# Patient Record
Sex: FEMALE | Race: WHITE
[De-identification: names, ages, dates, MRNs, and addresses within clinical notes are randomized per-mention and may not be internally consistent; named-entity substitution may affect disease eponyms.]

---

## 2017-11-28 ENCOUNTER — HOSPITAL ENCOUNTER (OUTPATIENT)
Dept: HOSPITAL 61 - PCVCIMAG | Age: 57
Discharge: HOME | End: 2017-11-28
Attending: INTERNAL MEDICINE
Payer: COMMERCIAL

## 2017-11-28 DIAGNOSIS — Z79.899: ICD-10-CM

## 2017-11-28 DIAGNOSIS — E11.9: ICD-10-CM

## 2017-11-28 DIAGNOSIS — R06.09: ICD-10-CM

## 2017-11-28 DIAGNOSIS — Z79.84: ICD-10-CM

## 2017-11-28 DIAGNOSIS — I10: Primary | ICD-10-CM

## 2017-11-28 DIAGNOSIS — Z87.891: ICD-10-CM

## 2017-11-28 PROCEDURE — 93306 TTE W/DOPPLER COMPLETE: CPT

## 2017-11-28 PROCEDURE — 93005 ELECTROCARDIOGRAM TRACING: CPT

## 2017-11-28 NOTE — PCVCIMAG
--------------- APPROVED REPORT --------------





Study performed:  11/28/2017 15:36:50



EXAM: Comprehensive 2D, Doppler, and color-flow 

Echocardiogram

Patient Location: Echo lab

Room #:  3Status:  routine



BSA:         2.13

HR: 78 bpmBP:          130/72 mmHg

Rhythm: NSR



Other Information 

Study Quality: Fair



Risk Factors: 

Cardiac Risk Factors:  DM, HTN



Indications

Diabetes

Dyspnea 

Hypertension/HDD



2D Dimensions

LVEF(%):  49.97 (&gt;50%)

IVSd:  6.56 (7-11mm)LVOT Diam:  19.10 (18-24mm) 

LVDd:  49.30 mm

PWd:  8.68 (7-11mm)

LVDs:  36.77 (25-40mm)

Left Atrium:  32.15 (27-40mm)

Aortic Root:  22.70 mm

LV Single Plane 4CH:  70.75 %

Felder's LVEF:  49.97 %



Volumes

Left Atrial Volume (Systole)

Single Plane 4CH:  53.85 mLSingle Plane 2CH:  21.55 mL

Biplane LA Volume:  41.00 mLLA ESV Index:  19.00 mL/m2



Aortic Valve

AoV Peak Puma.:  1.57 m/s

AO Peak Gr.:  9.83 mmHgLVOT Max PG:  3.18 mmHg

LVOT Max V:  0.89 m/s

ROCKY Vmax: 1.63 cm2



Mitral Valve

E/A Ratio:  1.1

MV Decel. Time:  155.04 ms

MV E Max Puma.:  0.96 m/s

MV A Puma.:  0.90 m/s

MV PHT:  44.96 ms

IVRT:  69.20 ms



TDI

E/Lateral E':  12.00E/Medial E':  16.00

Medial E' Puma.:  0.06 m/s

Lateral E' Puma.:  0.08 m/s



Pulmonary Valve

PV Peak Puma.:  1.04 m/sPV Peak Gr.:  4.31 mmHg



Pulmonary Vein

P Vein S:    0.59 m/sP Vein A:  0.36 m/s

P Vein D:   0.55 m/sP Vein A Dur.:  86.5 msec

P Vein S/D Ratio:  1.07



Tricuspid Valve

TV Vmax:  0.60 m/s



Left Ventricle

The left ventricle is normal size. There is normal LV segmental wall 

motion. There is normal left ventricular wall thickness. Left 

ventricular systolic function is normal. The left ventricular 

ejection fraction is within the normal range. LVEF is 65-70%.



Right Ventricle

The right ventricle is normal size. The right ventricular systolic 

function is normal.



Atria

The left atrium size is normal. The right atrium size is 

normal.



Aortic Valve

The aortic valve is normal in structure. No aortic regurgitation is 

present. There is no aortic valvular stenosis.



Mitral Valve

The mitral valve is normal in structure. There is no mitral valve 

regurgitation noted. No evidence of mitral valve stenosis.



Tricuspid Valve

The tricuspid valve is normal in structure. No tricuspid 

regurgitation. Doppler findings do not suggest pulmonary 

hypertension.



Pulmonic Valve

The pulmonary valve is normal in structure. There is no pulmonic 

valvular regurgitation.



Great Vessels

The aortic root is normal in size. The ascending aorta is normal in 

size. The inferior vena cava is not well 

visualized.



Pericardium

There is no pericardial effusion. There is no pleural 

effusion.



&lt;Conclusion&gt;

The left ventricle is normal size.

There is normal left ventricular wall thickness.

Left ventricular systolic function is normal.

The right ventricle is normal size.

The left atrium size is normal.

The aortic valve is normal in structure.

The mitral valve is normal in structure.

No tricuspid regurgitation.

There is no pericardial effusion.

## 2019-09-25 ENCOUNTER — HOSPITAL ENCOUNTER (OUTPATIENT)
Dept: HOSPITAL 61 - PCVCIMAG | Age: 59
Discharge: HOME | End: 2019-09-25
Attending: INTERNAL MEDICINE
Payer: COMMERCIAL

## 2019-09-25 DIAGNOSIS — Z01.818: Primary | ICD-10-CM

## 2019-09-25 PROCEDURE — 93306 TTE W/DOPPLER COMPLETE: CPT

## 2019-09-25 NOTE — PCVCIMAG
--------------- APPROVED REPORT --------------





Study performed:  2019 13:37:23



EXAM: Comprehensive 2D, Doppler, and color-flow 

Echocardiogram

Patient Location: Echo lab

Status:  routine



BSA:         2.22

HR: 67 bpmBP:          144/70 mmHg

Rhythm: NSR



Other Information 

Study Quality: Adequate



Risk Factors: 

Cardiac Risk Factors:  HTN



Indications

Pre-Op



2D Dimensions

IVSd:  10.47 (7-11mm)

LVDd:  43.00 mm

PWd:  10.40 (7-11mm)

LVDs:  35.25 (25-40mm)

Left Atrium:  32.43 (27-40mm)

Aortic Root:  28.79 mm

LV Single Plane 4CH:  48.27 %

LV Single Plane 2CH:  46.55 %

Biplane EF:  52.0 %



Volumes

Left Atrial Volume (Systole)

Single Plane 4CH:  49.79 mLSingle Plane 2CH:  68.24 mL

LA ESV Index:  27.00 mL/m2



Aortic Valve

AoV Peak Puma.:  1.52 m/s

AO Peak Gr.:  9.21 mmHgLVOT Max P.09 mmHg

LVOT Max V:  1.23 m/s



Mitral Valve

E/A Ratio:  1.7

MV Decel. Time:  280.09 ms

MV E Max Puma.:  0.93 m/s

MV A Puma.:  0.54 m/s

IVRT:  89.97 ms



Pulmonary Valve

PV Peak Puma.:  0.96 m/sPV Peak Gr.:  3.71 mmHg



Pulmonary Vein

P Vein S:    0.30 m/sP Vein A:  0.35 m/s

P Vein D:   0.47 m/sP Vein A Dur.:  134.9 msec

P Vein S/D Ratio:  0.64



Tricuspid Valve

TR Peak Puma.:  2.48 m/s

TR Peak Gr.:  24.63 mmHg



Left Ventricle

The left ventricle is normal size. There is normal LV segmental wall 

motion. There is normal left ventricular wall thickness. Left 

ventricular systolic function is normal. The left ventricular 

ejection fraction is within the normal range. LVEF is 55%. Grade II - 

pseudonormal filling dynamics.



Right Ventricle

The right ventricle is normal size. The right ventricular systolic 

function is normal.



Atria

The left atrium size is normal. The right atrium size is 

normal.



Aortic Valve

The aortic valve is normal in structure. No aortic regurgitation is 

present. There is no aortic valvular stenosis.



Mitral Valve

The mitral valve is normal in structure. There is no mitral valve 

regurgitation noted. No evidence of mitral valve stenosis.



Tricuspid Valve

The tricuspid valve is normal in structure. Trace tricuspid 

regurgitation with PAP of 32 mmHg.



Pulmonic Valve

The pulmonary valve is normal in structure. There is no pulmonic 

valvular regurgitation.



Great Vessels

The aortic root is normal in size. IVC is normal in size and 

collapses >50% with inspiration.



Pericardium

There is no pericardial effusion. There is no pleural 

effusion.



<Conclusion>

The left ventricle is normal size.

There is normal left ventricular wall thickness.

Left ventricular systolic function is normal.

Grade II - pseudonormal filling dynamics.

The right ventricle is normal size.

The left atrium size is normal.

The aortic valve is normal in structure.

There is no mitral valve regurgitation noted.

Trace tricuspid regurgitation with PAP of 32 mmHg.

## 2019-11-12 ENCOUNTER — HOSPITAL ENCOUNTER (OUTPATIENT)
Dept: HOSPITAL 61 - PCVCIMAG | Age: 59
Discharge: HOME | End: 2019-11-12
Attending: INTERNAL MEDICINE
Payer: COMMERCIAL

## 2019-11-12 DIAGNOSIS — Z87.891: ICD-10-CM

## 2019-11-12 DIAGNOSIS — R60.9: ICD-10-CM

## 2019-11-12 DIAGNOSIS — Z79.84: ICD-10-CM

## 2019-11-12 DIAGNOSIS — I10: ICD-10-CM

## 2019-11-12 DIAGNOSIS — Z01.818: Primary | ICD-10-CM

## 2019-11-12 PROCEDURE — 78452 HT MUSCLE IMAGE SPECT MULT: CPT

## 2019-11-12 PROCEDURE — 93017 CV STRESS TEST TRACING ONLY: CPT

## 2019-11-12 PROCEDURE — A9500 TC99M SESTAMIBI: HCPCS

## 2019-11-12 NOTE — PCVCIMAG
--------------- APPROVED REPORT --------------





Imaging Protocol: Rest Tc-99m/Stress Tc-99m 1 day

Study performed:  11/12/2019 09:40:47



Indication: Pre Op

Patient Location: Out-Patient

Stress Nurse: Vicky Aguero RN, Radha West RN

NM Tech:Leon Ramsay NMZAKB



Ht: 5 ft 7 in Wt: 250 lbs BSA:  2.22 m2

HR: 66 bpm                      BP: 154/78 mmHg         BMI:  

39.15

Rhythm:  Sinus Arrythmia



Medical History

Medical History: Age, HTN, DM Insulin, Former Smoker

Medications: Labetalol, Nifedipine

Allergies: Cipro, Macrobid, Sulfa

Pretest Chest Pain Characteristics: No chest pain

Exercise History: Indeterminate



Resting Data

Rest SPECT myocardial perfusion imaging was performed in supine 

position 45 minutes following the intravenous injection of 15.6 mCi 

of Tc-99m Sestamibi.

Time of rest injection: 0920     Date: 11/12/2019

Administration Route: IV

Administration Site: Left Arm



Pharmacologic Stress

Pharmacologic stress test was performed by injecting Regadenoson 0.4 

mg IV push over 10-15 seconds immediately followed by the intravenous 

injection of 46.5 mCi of Tc-99m Sestamibi.

Time of stress injection: 1035     Date: 11/12/2019

Administration Route: IV

Administration Site: Left Arm

Gated Stress SPECT was performed 45 minutes after stress 

injection.

The images were gated to evaluate regional wall motion and calculate 

left ventricular ejection fraction. 



Stress Test Details

Stress Test:  Pharmacologic stress was paired with low level 

exercise.

  Reason for pharmacologic stress test: Ankle Problems.



HRMax Heart Rate (APMHR): 164 bpm 

Resting HR:            66 bpmTarget HR (85% APMHR): 139 bpm

Max HR Achieved:  103 bpm

% of APMHR:         62

Recovery HR:            61 bpm



BP

Resting BP:  154/78 mmHg

Max BP:       174/78 mmHg

Recovery BP:       157/71 mmHg

ECG

Resting ECG:  Sinus Rhythm

Stress ECG:     Sinus Tachycardia

ST Change: Non-ischemic

Arrhythmia:    PVC's

Recovery ECG: Sinus Rhythm



Clinical

Reason for Termination: Completed protocol

Stress Symptoms: Dyspnea

Symptoms resolved during recovery.



Study Quality

Study: Good

Artifact: Mild Breast artifact



Study Data

Post stress, the left ventricular ejection was 69%..

SSS: 4

SRS: 8

SDS: 0

TID = 0.91.



Perfusion

There is a medium area of mildly reduced uptake in the mid and apical 

segment of the anterior wall which is seen on the stress images as 

well as the resting images. This area thickens and moves normally and 

is most consistent with attenuation artifact.



Wall Motion

Normal left ventricular wall motion.



Nuclear Conclusion

ECG Findings: negative for ischemia 

Clinical Findings: non-diagnostic 

Nuclear Findings: negative for ischemia 

Exercise Capacity: not assessed

Left Ventricular Function: normal 

Risk Study: low

This study is of low probability for inducible ischemia or prior 

infarct.

Normal global and segmental LV systolic function.

Artifact:  Mild Breast artifact